# Patient Record
Sex: FEMALE | Race: OTHER | HISPANIC OR LATINO | URBAN - METROPOLITAN AREA
[De-identification: names, ages, dates, MRNs, and addresses within clinical notes are randomized per-mention and may not be internally consistent; named-entity substitution may affect disease eponyms.]

---

## 2020-06-11 ENCOUNTER — EMERGENCY (EMERGENCY)
Facility: HOSPITAL | Age: 40
LOS: 1 days | Discharge: HOME | End: 2020-06-11
Attending: EMERGENCY MEDICINE
Payer: MEDICAID

## 2020-06-11 VITALS
WEIGHT: 139.99 LBS | TEMPERATURE: 98 F | OXYGEN SATURATION: 100 % | SYSTOLIC BLOOD PRESSURE: 116 MMHG | DIASTOLIC BLOOD PRESSURE: 79 MMHG | RESPIRATION RATE: 18 BRPM | HEART RATE: 85 BPM

## 2020-06-11 LAB
ALBUMIN SERPL ELPH-MCNC: 4.6 G/DL — SIGNIFICANT CHANGE UP (ref 3.3–5)
ALP SERPL-CCNC: 85 U/L — SIGNIFICANT CHANGE UP (ref 40–120)
ALT FLD-CCNC: 26 U/L — SIGNIFICANT CHANGE UP (ref 10–45)
ANION GAP SERPL CALC-SCNC: 12 MMOL/L — SIGNIFICANT CHANGE UP (ref 5–17)
APPEARANCE UR: CLEAR — SIGNIFICANT CHANGE UP
APTT BLD: 30.1 SEC — SIGNIFICANT CHANGE UP (ref 27.5–36.3)
AST SERPL-CCNC: 22 U/L — SIGNIFICANT CHANGE UP (ref 10–40)
BASOPHILS # BLD AUTO: 0.07 K/UL — SIGNIFICANT CHANGE UP (ref 0–0.2)
BASOPHILS NFR BLD AUTO: 0.6 % — SIGNIFICANT CHANGE UP (ref 0–2)
BILIRUB SERPL-MCNC: 0.2 MG/DL — SIGNIFICANT CHANGE UP (ref 0.2–1.2)
BILIRUB UR-MCNC: NEGATIVE — SIGNIFICANT CHANGE UP
BLD GP AB SCN SERPL QL: NEGATIVE — SIGNIFICANT CHANGE UP
BUN SERPL-MCNC: 21 MG/DL — SIGNIFICANT CHANGE UP (ref 7–23)
CALCIUM SERPL-MCNC: 9.4 MG/DL — SIGNIFICANT CHANGE UP (ref 8.4–10.5)
CHLORIDE SERPL-SCNC: 102 MMOL/L — SIGNIFICANT CHANGE UP (ref 96–108)
CO2 SERPL-SCNC: 24 MMOL/L — SIGNIFICANT CHANGE UP (ref 22–31)
COLOR SPEC: SIGNIFICANT CHANGE UP
CREAT SERPL-MCNC: 0.86 MG/DL — SIGNIFICANT CHANGE UP (ref 0.5–1.3)
DIFF PNL FLD: ABNORMAL
EOSINOPHIL # BLD AUTO: 0.11 K/UL — SIGNIFICANT CHANGE UP (ref 0–0.5)
EOSINOPHIL NFR BLD AUTO: 1 % — SIGNIFICANT CHANGE UP (ref 0–6)
GLUCOSE SERPL-MCNC: 82 MG/DL — SIGNIFICANT CHANGE UP (ref 70–99)
GLUCOSE UR QL: NEGATIVE — SIGNIFICANT CHANGE UP
HCG UR QL: NEGATIVE — SIGNIFICANT CHANGE UP
HCT VFR BLD CALC: 40.2 % — SIGNIFICANT CHANGE UP (ref 34.5–45)
HGB BLD-MCNC: 13.2 G/DL — SIGNIFICANT CHANGE UP (ref 11.5–15.5)
IMM GRANULOCYTES NFR BLD AUTO: 0.3 % — SIGNIFICANT CHANGE UP (ref 0–1.5)
INR BLD: 1.03 RATIO — SIGNIFICANT CHANGE UP (ref 0.88–1.16)
KETONES UR-MCNC: NEGATIVE — SIGNIFICANT CHANGE UP
LEUKOCYTE ESTERASE UR-ACNC: ABNORMAL
LIDOCAIN IGE QN: 26 U/L — SIGNIFICANT CHANGE UP (ref 7–60)
LYMPHOCYTES # BLD AUTO: 3.96 K/UL — HIGH (ref 1–3.3)
LYMPHOCYTES # BLD AUTO: 36.3 % — SIGNIFICANT CHANGE UP (ref 13–44)
MCHC RBC-ENTMCNC: 29.9 PG — SIGNIFICANT CHANGE UP (ref 27–34)
MCHC RBC-ENTMCNC: 32.8 GM/DL — SIGNIFICANT CHANGE UP (ref 32–36)
MCV RBC AUTO: 91 FL — SIGNIFICANT CHANGE UP (ref 80–100)
MONOCYTES # BLD AUTO: 0.97 K/UL — HIGH (ref 0–0.9)
MONOCYTES NFR BLD AUTO: 8.9 % — SIGNIFICANT CHANGE UP (ref 2–14)
NEUTROPHILS # BLD AUTO: 5.77 K/UL — SIGNIFICANT CHANGE UP (ref 1.8–7.4)
NEUTROPHILS NFR BLD AUTO: 52.9 % — SIGNIFICANT CHANGE UP (ref 43–77)
NITRITE UR-MCNC: NEGATIVE — SIGNIFICANT CHANGE UP
NRBC # BLD: 0 /100 WBCS — SIGNIFICANT CHANGE UP (ref 0–0)
PH UR: 7.5 — SIGNIFICANT CHANGE UP (ref 5–8)
PLATELET # BLD AUTO: 362 K/UL — SIGNIFICANT CHANGE UP (ref 150–400)
POTASSIUM SERPL-MCNC: 3.6 MMOL/L — SIGNIFICANT CHANGE UP (ref 3.5–5.3)
POTASSIUM SERPL-SCNC: 3.6 MMOL/L — SIGNIFICANT CHANGE UP (ref 3.5–5.3)
PROT SERPL-MCNC: 7.8 G/DL — SIGNIFICANT CHANGE UP (ref 6–8.3)
PROT UR-MCNC: SIGNIFICANT CHANGE UP
PROTHROM AB SERPL-ACNC: 11.7 SEC — SIGNIFICANT CHANGE UP (ref 10–12.9)
RBC # BLD: 4.42 M/UL — SIGNIFICANT CHANGE UP (ref 3.8–5.2)
RBC # FLD: 11.7 % — SIGNIFICANT CHANGE UP (ref 10.3–14.5)
RH IG SCN BLD-IMP: NEGATIVE — SIGNIFICANT CHANGE UP
SODIUM SERPL-SCNC: 138 MMOL/L — SIGNIFICANT CHANGE UP (ref 135–145)
SP GR SPEC: 1.03 — HIGH (ref 1.01–1.02)
UROBILINOGEN FLD QL: ABNORMAL
WBC # BLD: 10.91 K/UL — HIGH (ref 3.8–10.5)
WBC # FLD AUTO: 10.91 K/UL — HIGH (ref 3.8–10.5)

## 2020-06-11 PROCEDURE — 99285 EMERGENCY DEPT VISIT HI MDM: CPT

## 2020-06-11 PROCEDURE — 99283 EMERGENCY DEPT VISIT LOW MDM: CPT

## 2020-06-11 RX ORDER — KETOROLAC TROMETHAMINE 30 MG/ML
15 SYRINGE (ML) INJECTION ONCE
Refills: 0 | Status: DISCONTINUED | OUTPATIENT
Start: 2020-06-11 | End: 2020-06-11

## 2020-06-11 RX ORDER — SODIUM CHLORIDE 9 MG/ML
1000 INJECTION INTRAMUSCULAR; INTRAVENOUS; SUBCUTANEOUS ONCE
Refills: 0 | Status: COMPLETED | OUTPATIENT
Start: 2020-06-11 | End: 2020-06-11

## 2020-06-11 RX ADMIN — SODIUM CHLORIDE 1000 MILLILITER(S): 9 INJECTION INTRAMUSCULAR; INTRAVENOUS; SUBCUTANEOUS at 23:27

## 2020-06-11 RX ADMIN — Medication 15 MILLIGRAM(S): at 23:58

## 2020-06-11 RX ADMIN — SODIUM CHLORIDE 1000 MILLILITER(S): 9 INJECTION INTRAMUSCULAR; INTRAVENOUS; SUBCUTANEOUS at 23:59

## 2020-06-11 NOTE — ED ADULT NURSE NOTE - OBJECTIVE STATEMENT
pt ambulatory to room 8 c/o 2 day h/o rlq intermittant pain, pt denies nausea vomiting fever no change witheating. ptneeds to hold r side when walking rlq very tender to touch pt was seen at  IV placed RL ac labs sent pt declines pain med.

## 2020-06-11 NOTE — ED PROVIDER NOTE - OBJECTIVE STATEMENT
40y F w/ PMHx s/p breast implant p/w intermittent RLQ abd pain for 2d. Pain is worsened when walking. Food does not make pain worse. Pain stays for a few hours that generally resolves on its own. Pt went to UC and said it may be APPY and to come to ED for further eval. Last nml BM was this morning. LMP May 20th. Denies n/v/d, fevers, chills, or urinary sx. 40y F w/ PMHx s/p breast implant p/w intermittent RLQ abd pain for 2d. Pain is worsen with walking. Not worse with eating. Pain onsets suddenly and stays for a few hours then resolves on its own. Denies n/v/d, fevers, chills, or urinary sx. Pt went to  today had UA negative for infection, told it may be APPY and referred to the ED. Last nml BM was this morning. LMP May 20th. Denies cp, palpitations, sob, cough, HA, blurry vision, focal weakness. 40y F w/ PMHx s/p breast implant p/w intermittent RLQ abd pain for 2d. Pain is worsen with walking. Not worse with eating. Pain onsets suddenly and stays for a few hours then resolves on its own. Denies n/v/d, fevers, chills, or urinary sx. Pt went to  today had UA negative for infection, told it may be APPY and referred to the ED. Last nml BM was this morning. LMP May 20th. Denies cp, palpitations, sob, cough, HA, blurry vision, focal weakness.    obgyn- Dr. Shira High MD

## 2020-06-11 NOTE — ED PROVIDER NOTE - PROGRESS NOTE DETAILS
Ford, PGY1 - consulted surgery for ? early appy not visualized on CT - rec US appendix, will order. Discussed plan for observation for serial abd exams in CDU, pt pending covid swab. agreed to see pt pettet- received sign out, patient pending surgical eval for abd pain, patient understands plan and patient re-evaluated and doing well.  No acute issues at  this time. Updated patient with results thus far, pain well controlled with 2nd round analgesia. surg at bedside evaluating patient pettet- pain returning and severe, surgery cleared patient unlikely appx, reevaluated patient, in 10/10 pain, consulted obgyn for possible torsion will come to eval patient pettet- obgyn and surgery cleared patient, Patient feels well, tolerating PO. Discussed lab and radiology findings with patient. Patient feels comfortable going home. Gave home care and follow up instructions. Discussed which symptoms to look out for and when to return to the ED for further evaluation. Patient given opportunity to ask questions about their medical condition and had all questions answered. ATTG: : patient endorsed to me by Dr. Romeo, awaiting gyn. evaluated and no further intervention. patient feels better. no ttp on my re exam. trial po meds and home and return if worsens. return precautions provided.

## 2020-06-11 NOTE — ED PROVIDER NOTE - PHYSICAL EXAMINATION
GEN: NAD, well appearing young female  CHEST/LUNGS: CTAB  CARDIAC: No murmur or rubs, RRR  ABDOMEN: TTP RLQ, no rebound rigidity or guarding, negative Rovsing's  MSK: No lower ext edema, no CVA tenderness  SKIN: No rashes bruises GEN: NAD, well appearing young female  CHEST/LUNGS: CTAB no wheezes or rhonchi   CARDIAC: No murmur or rubs, RRR  ABDOMEN: soft non-distended +TTP RLQ, no rebound rigidity or guarding, negative Rovsing's  MSK: No lower ext edema, no CVA tenderness  SKIN: No rashes or bruises  PSYCH: appropriate mood normal affect GEN: NAD, well appearing young female  CHEST/LUNGS: CTAB no wheezes or rhonchi   CARDIAC: No murmur or rubs, RRR  ABDOMEN: soft non-distended +TTP RLQ, no rebound rigidity or guarding, negative Rovsing's  : no CMT, no vaginal discharge or bleeding, no adnexal tenderness b/l   MSK: No lower ext edema, no CVA tenderness  SKIN: No rashes or bruises  PSYCH: appropriate mood normal affect

## 2020-06-11 NOTE — ED PROVIDER NOTE - NSFOLLOWUPCLINICS_GEN_ALL_ED_FT
A Family Medicine Doctor  Family Medicine  .  [place]   Phone:   Fax:   Follow Up Time: Urgent    An OB/GYN physician  Obstetrics & Gynecology  .  [place]   Phone:   Fax:   Follow Up Time: Urgent

## 2020-06-11 NOTE — ED PROVIDER NOTE - CHPI ED SYMPTOMS NEG
no nausea/no blood in stool/no diarrhea/no chills/no dysuria/no fever/no hematuria/no vomiting/no burning urination

## 2020-06-11 NOTE — ED PROVIDER NOTE - CLINICAL SUMMARY MEDICAL DECISION MAKING FREE TEXT BOX
Alicia Winters): 40y F , no hx of abd surgery p/w 2d intermittent abd pain worsen w/ walking. No fever n/v/d. DDx APPY also considered cholecystitis, ruptured ectopic, but lower suspicion given LMP 3 weeks ago. Plan labs, UA, HGG, CT, r/o APPY. Alicia Winters): 40y F , no hx of abd surgery p/w 2d intermittent abd pain worsen w/ walking. No fever n/v/d. DDx appy, torsion, also considered cholecystitis, ruptured ectopic, but lower suspicion given LMP 3 weeks ago. Plan labs, UA, HGG, pelvic US r/o torsion CT r/o APPY. Alicia Winters): 40y F , no hx of abd surgery p/w 2d intermittent abd pain worsen w/ walking. No fever n/v/d. DDx appy, torsion, also considered cholecystitis, ruptured ectopic, but lower suspicion given LMP 3 weeks ago. Plan labs, UA, HGG, pelvic US r/o torsion CT r/o APPY.    Attending Statement: Agree with the above.  RLQ pain, intermittent/waxing/waning, worse c ambulation.  Uncomfortable appearing with focal RLQ tender to palpation but no rosving/psoas and HDS.  No CVAT.  DDx as above.  Likely appy v ruptured ovarian cyst, less likely torsion though possible.  Needs plan as above, possible surgery/OB cs based on imaging/clincial course.  --BMM

## 2020-06-11 NOTE — ED PROVIDER NOTE - NSFOLLOWUPINSTRUCTIONS_ED_ALL_ED_FT
Abdominal Pain    Many things can cause abdominal pain. Many times, abdominal pain is not caused by a disease and will improve without treatment. Your health care provider will do a physical exam to determine if there is a dangerous cause of your pain; blood tests and imaging may help determine the cause of your pain. However, in many cases, no cause may be found and you may need further testing as an outpatient. Monitor your abdominal pain for any changes.     SEEK IMMEDIATE MEDICAL CARE IF YOU HAVE ANY OF THE FOLLOWING SYMPTOMS: worsening abdominal pain, uncontrollable vomiting, profuse diarrhea, inability to have bowel movements or pass gas, black or bloody stools, fever accompanying chest pain or back pain, or fainting. These symptoms may represent a serious problem that is an emergency. Do not wait to see if the symptoms will go away. Get medical help right away. Call 911 and do not drive yourself to the hospital.    1. TAKE ALL MEDICATIONS AS DIRECTED.    2. FOR PAIN OR FEVER YOU CAN TAKE IBUPROFEN (MOTRIN, ADVIL) OR ACETAMINOPHEN (TYLENOL) AS NEEDED, AS DIRECTED ON PACKAGING.  3. FOLLOW UP WITH YOUR PRIMARY DOCTOR WITHIN 5 DAYS AS DIRECTED.  4. IF YOU HAD LABS OR IMAGING DONE, YOU WERE GIVEN COPIES OF ALL LABS AND/OR IMAGING RESULTS FROM YOUR ER VISIT--PLEASE TAKE THEM WITH YOU TO YOUR FOLLOW UP APPOINTMENTS.  5. RETURN TO THE ER FOR ANY WORSENING SYMPTOMS OR CONCERNS.

## 2020-06-11 NOTE — ED PROVIDER NOTE - PATIENT PORTAL LINK FT
You can access the FollowMyHealth Patient Portal offered by [facility] by registering at the following website: [email or web address]. By joining [employer]’s FollowMyHealth portal, you will also be able to view your health information using other applications (apps) compatible with our system.

## 2020-06-12 VITALS
RESPIRATION RATE: 16 BRPM | HEART RATE: 91 BPM | DIASTOLIC BLOOD PRESSURE: 60 MMHG | OXYGEN SATURATION: 99 % | SYSTOLIC BLOOD PRESSURE: 100 MMHG

## 2020-06-12 LAB
BACTERIA # UR AUTO: ABNORMAL
EPI CELLS # UR: 3 /HPF — SIGNIFICANT CHANGE UP
HYALINE CASTS # UR AUTO: 5 /LPF — HIGH (ref 0–2)
RBC CASTS # UR COMP ASSIST: 4 /HPF — SIGNIFICANT CHANGE UP (ref 0–4)
SARS-COV-2 RNA SPEC QL NAA+PROBE: SIGNIFICANT CHANGE UP
WBC UR QL: 6 /HPF — HIGH (ref 0–5)

## 2020-06-12 PROCEDURE — 96376 TX/PRO/DX INJ SAME DRUG ADON: CPT

## 2020-06-12 PROCEDURE — 81001 URINALYSIS AUTO W/SCOPE: CPT

## 2020-06-12 PROCEDURE — 86850 RBC ANTIBODY SCREEN: CPT

## 2020-06-12 PROCEDURE — 93975 VASCULAR STUDY: CPT | Mod: 26

## 2020-06-12 PROCEDURE — 74177 CT ABD & PELVIS W/CONTRAST: CPT | Mod: 26

## 2020-06-12 PROCEDURE — 96361 HYDRATE IV INFUSION ADD-ON: CPT

## 2020-06-12 PROCEDURE — 86901 BLOOD TYPING SEROLOGIC RH(D): CPT

## 2020-06-12 PROCEDURE — 80053 COMPREHEN METABOLIC PANEL: CPT

## 2020-06-12 PROCEDURE — 81025 URINE PREGNANCY TEST: CPT

## 2020-06-12 PROCEDURE — 99284 EMERGENCY DEPT VISIT MOD MDM: CPT | Mod: 25

## 2020-06-12 PROCEDURE — 83690 ASSAY OF LIPASE: CPT

## 2020-06-12 PROCEDURE — 86900 BLOOD TYPING SEROLOGIC ABO: CPT

## 2020-06-12 PROCEDURE — 76856 US EXAM PELVIC COMPLETE: CPT

## 2020-06-12 PROCEDURE — 76830 TRANSVAGINAL US NON-OB: CPT

## 2020-06-12 PROCEDURE — 74177 CT ABD & PELVIS W/CONTRAST: CPT

## 2020-06-12 PROCEDURE — 76705 ECHO EXAM OF ABDOMEN: CPT | Mod: 26,59

## 2020-06-12 PROCEDURE — 76856 US EXAM PELVIC COMPLETE: CPT | Mod: 26,59

## 2020-06-12 PROCEDURE — 96375 TX/PRO/DX INJ NEW DRUG ADDON: CPT

## 2020-06-12 PROCEDURE — 76705 ECHO EXAM OF ABDOMEN: CPT

## 2020-06-12 PROCEDURE — 85730 THROMBOPLASTIN TIME PARTIAL: CPT

## 2020-06-12 PROCEDURE — 85610 PROTHROMBIN TIME: CPT

## 2020-06-12 PROCEDURE — 96365 THER/PROPH/DIAG IV INF INIT: CPT | Mod: XU

## 2020-06-12 PROCEDURE — 93975 VASCULAR STUDY: CPT

## 2020-06-12 PROCEDURE — 85027 COMPLETE CBC AUTOMATED: CPT

## 2020-06-12 PROCEDURE — 76830 TRANSVAGINAL US NON-OB: CPT | Mod: 26

## 2020-06-12 RX ORDER — ACETAMINOPHEN 500 MG
650 TABLET ORAL ONCE
Refills: 0 | Status: COMPLETED | OUTPATIENT
Start: 2020-06-12 | End: 2020-06-12

## 2020-06-12 RX ORDER — ACETAMINOPHEN 500 MG
975 TABLET ORAL ONCE
Refills: 0 | Status: COMPLETED | OUTPATIENT
Start: 2020-06-12 | End: 2020-06-12

## 2020-06-12 RX ORDER — KETOROLAC TROMETHAMINE 30 MG/ML
15 SYRINGE (ML) INJECTION ONCE
Refills: 0 | Status: DISCONTINUED | OUTPATIENT
Start: 2020-06-12 | End: 2020-06-12

## 2020-06-12 RX ORDER — CEFTRIAXONE 500 MG/1
1000 INJECTION, POWDER, FOR SOLUTION INTRAMUSCULAR; INTRAVENOUS ONCE
Refills: 0 | Status: COMPLETED | OUTPATIENT
Start: 2020-06-12 | End: 2020-06-12

## 2020-06-12 RX ADMIN — Medication 15 MILLIGRAM(S): at 09:35

## 2020-06-12 RX ADMIN — Medication 650 MILLIGRAM(S): at 03:14

## 2020-06-12 RX ADMIN — CEFTRIAXONE 1000 MILLIGRAM(S): 500 INJECTION, POWDER, FOR SOLUTION INTRAMUSCULAR; INTRAVENOUS at 03:15

## 2020-06-12 RX ADMIN — Medication 975 MILLIGRAM(S): at 12:15

## 2020-06-12 RX ADMIN — CEFTRIAXONE 100 MILLIGRAM(S): 500 INJECTION, POWDER, FOR SOLUTION INTRAMUSCULAR; INTRAVENOUS at 02:17

## 2020-06-12 NOTE — CONSULT NOTE ADULT - SUBJECTIVE AND OBJECTIVE BOX
SURGERY CONSULT NOTE  MARCI NATARAJAN  |  [id number]  |  20 @ 09:02    CC: Patient is a 40y old  Female who presents with a chief complaint of abdominal pain.     HPI:  40F no hx p/w 2d of sharp rlq abd pain. Pain started sharply 2d ago and was not brought on by anything. Patient states the pain originated in the RLQ and has not moved in location or changed in quality.  Intermittent changes in severity. Has been tolerating a diet.  Denies any changes in bowel or bladder function, denies any nausea / vomiting.     Denies any fevers / chills / ha / cp / sob / nausea / vomiting / changes in bowel or bladder function.   Denies hematuria / hematochezia / hematemesis / melena.     LMP end of may.     REVIEW OF SYSTEMS:  As above.     PAST MEDICAL & SURGICAL HISTORY:  s/p b/l breast implants.     ALLERGIES:  No Known Allergies or Intolerances    SOCIAL HISTORY:  Smoking Hx: denies  Etoh Hx: social   IVDA Hx: denies    FAMILY HISTORY:  - unless noted, no significant family hx with Mother, Father, Siblings    Objective:   Vital Signs Last 24 Hrs  T(C): 36.6 (2020 07:52), Max: 36.8 (2020 20:45)  T(F): 97.8 (2020 07:52), Max: 98.3 (2020 20:45)  HR: 81 (2020 07:52) (72 - 85)  BP: 117/80 (2020 07:52) (108/74 - 117/80)  RR: 17 (2020 07:52) (16 - 18)  SpO2: 98% (2020 07:52) (98% - 100%)    Physical Exam:  General: Well developed, well nourished, alert and cooperative, and appears to be in no acute distress.  HEENT: normocephalic, vision is grossly intact  Chest: respirations grossly unlabored, lying supine on ra  Abdomen: soft, non-distended, focally tender to deep palpation in the right lower quadrant approx 2cm above the inguinal ligament. no guarding or rebound. Negative psoas sign. Negative rovsing sign. Negative obturator sign.     LABS:                        13.2   10.91 )-----------( 362      ( 2020 22:47 )             40.2     06-11    138  |  102  |  21  ----------------------------<  82  3.6   |  24  |  0.86    Ca    9.4      2020 22:47    TPro  7.8  /  Alb  4.6  /  TBili  0.2  /  DBili  x   /  AST  22  /  ALT  26  /  AlkPhos  85  06-11    PT/INR - ( 2020 23:06 )   PT: 11.7 sec;   INR: 1.03 ratio    PTT - ( 2020 23:06 )  PTT:30.1 sec    LIVER FUNCTIONS - ( 2020 22:47 )  Alb: 4.6 g/dL / Pro: 7.8 g/dL / ALK PHOS: 85 U/L / ALT: 26 U/L / AST: 22 U/L / GGT: x           Urinalysis Basic - ( 2020 23:34 )    Color: Light Yellow / Appearance: Clear / S.026 / pH: x  Gluc: x / Ketone: Negative  / Bili: Negative / Urobili: 2 mg/dL   Blood: x / Protein: Trace / Nitrite: Negative   Leuk Esterase: Moderate / RBC: 4 /hpf / WBC 6 /HPF   Sq Epi: x / Non Sq Epi: 3 /hpf / Bacteria: Many    RADIOLOGY & ADDITIONAL STUDIES:    < from: US Transvaginal (20 @ 02:07) >  IMPRESSION:    No sonographic evidence of ovarian torsion.    2 cm right ovarian complex septated cyst, likely corpus luteum cyst. Follow-up pelvic ultrasound in 1-2 menstrual cycle is advised to demonstrate resolution.    < end of copied text >  < from: US Appendix (20 @ 06:45) >  FINDINGS/  IMPRESSION:     The appendix is not visualized, rendering the study nondiagnostic in evaluation for acute appendicitis. However, please note that a normal appendix is identified on the prior CT, in retrospect.    < end of copied text >      < from: CT Abdomen and Pelvis w/ IV Cont (20 @ 00:12) >  IMPRESSION:     Appendix is not visualized without secondary findings of acute appendicitis. Recommendclinical correlation.    Heterogeneous globular uterus. 2 x 1.6 cm right adnexal density, likely corpus luteum or complex ovarian cyst. Consider nonemergent correlation with pelvic ultrasound if indicated.    < end of copied text >

## 2020-06-12 NOTE — CONSULT NOTE ADULT - SUBJECTIVE AND OBJECTIVE BOX
Gyn Consult Note  MARCI NATARAJAN  40y  Female [id number]    HPI:      Name of GYN Physician: Dr. Parson    OBHx: NSVDx1 ()  GYNHx: Denies fibroids, cysts, endometriosis, STI's, abnormal pap smears   PMHx: Denies  PSHx: Breast augmentation  Meds: Denies  Allergies: NKDA    Vital Signs Last 24 Hrs  T(C): 36.6 (2020 07:52), Max: 36.8 (2020 20:45)  T(F): 97.8 (2020 07:52), Max: 98.3 (2020 20:45)  HR: 81 (2020 07:52) (72 - 85)  BP: 117/80 (2020 07:52) (108/74 - 117/80)  RR: 17 (2020 07:52) (16 - 18)  SpO2: 98% (2020 07:52) (98% - 100%)    Physical Exam:   General: Resting comfortably in bed, NAD   Back: No CVA tenderness  Abd: Soft, non-distended. RLQ tenderness to deep palpation. No rebound tenderness or guarding.  :  No bleeding.  External labia wnl.  Bimanual exam with no cervical motion tenderness, uterus wnl, adnexa non palpable b/l.  Cervix closed.   Ext: non-tender b/l, no edema     LABS:  Pregnancy Profile, Urine: Negative (20 @ 23:34)                          13.2   10.91 )-----------( 362      ( 2020 22:47 )             40.2     06-11    138  |  102  |  21  ----------------------------<  82  3.6   |  24  |  0.86    Ca    9.4      2020 22:47    TPro  7.8  /  Alb  4.6  /  TBili  0.2  /  DBili  x   /  AST  22  /  ALT  26  /  AlkPhos  85  06-11    I&O's Detail    PT/INR - ( 2020 23:06 )   PT: 11.7 sec;   INR: 1.03 ratio         PTT - ( 2020 23:06 )  PTT:30.1 sec  Urinalysis Basic - ( 2020 23:34 )    Color: Light Yellow / Appearance: Clear / S.026 / pH: x  Gluc: x / Ketone: Negative  / Bili: Negative / Urobili: 2 mg/dL   Blood: x / Protein: Trace / Nitrite: Negative   Leuk Esterase: Moderate / RBC: 4 /hpf / WBC 6 /HPF   Sq Epi: x / Non Sq Epi: 3 /hpf / Bacteria: Many        RADIOLOGY & ADDITIONAL STUDIES:  EXAM:  US PELVIC COMPLETE                        EXAM:  US DPLX PELVIC                        EXAM:  US TRANSVAGINAL                        PROCEDURE DATE:  2020      INTERPRETATION:  CLINICAL INFORMATION: Right lower quadrant pain.  LMP: 2020.  COMPARISON: Correlate with same day CT abdomen and pelvis.    TECHNIQUE:   Endovaginal and transabdominal pelvic sonogram. Color and Spectral Doppler was performed.    FINDINGS:  Uterus: Heterogenous in appearance, measuring 8.2 x 4.3 x 4.8 cm.  Endometrium: 11.1 mm. Within normal limits.    Right ovary: 2.2 x 1.9 x 2.9 cm with a 1.5 x 1.0 x 1.8 cm complex cystic lesion containing septations and echogenic foci. Normal arterial and venous waveforms.  Left ovary: 2.7 x1.9 x 1.8 cm. Within normal limits. Normal arterial and venous waveforms.    Fluid: Trace cul-de-sac free fluid.    IMPRESSION:  No sonographic evidence of ovarian torsion.  2 cm right ovarian complex septated cyst, likely corpus luteum cyst. Follow-up pelvic ultrasound in 1-2 menstrual cycle is advised to demonstrate resolution.    A/P:    Ana Cristina Rene PGY-1 Gyn Consult Note  MARCI NATARAJAN  40y  Female [id number]    HPI: 41y/o  LMP  presenting with 2d hx of RLQ pain. Patient unable to identify any triggering factor to pain - denies onset during intercourse, exercise or sudden movement. States pain is sharp and constant,9/10 however currently improved after pain medication (Toradol at 12AM, Acetaminophen at 3AM). Denies nausea, emesis or difficulty tolerating PO (last ate 5PM ). Notes pain is worse when sitting or with movement. Has taken acetaminophen and ibuprofen at home, and notes only minimal improvement. She denies hx of UTI's. Denies urinary urgency, frequency or dysuria.     Ms. Natarajan is sexually active, uses condoms for contraception. Denies recent sexual activity. Denies hx of STI. Denies abnormal vaginal discharge, itching or discomfort. Endorses regular periods.     Urine hcg negative in ED.     Name of GYN Physician: Dr. Parson    OBHx: NSVDx1 ()  GYNHx: Denies fibroids, cysts, endometriosis, STI's, abnormal pap smears   PMHx: Denies  PSHx: Breast augmentation  Meds: Denies  Allergies: NKDA    Vital Signs Last 24 Hrs  T(C): 36.6 (2020 07:52), Max: 36.8 (2020 20:45)  T(F): 97.8 (2020 07:52), Max: 98.3 (2020 20:45)  HR: 81 (2020 07:52) (72 - 85)  BP: 117/80 (2020 07:52) (108/74 - 117/80)  RR: 17 (2020 07:52) (16 - 18)  SpO2: 98% (2020 07:52) (98% - 100%)    Physical Exam:   General: Resting comfortably in bed, NAD   Back: No CVA tenderness  Abd: Soft, non-distended. RLQ tenderness to deep palpation. No rebound tenderness or guarding.  :  No bleeding.  External labia wnl.  Bimanual exam with no cervical motion tenderness, uterus wnl, adnexa non palpable b/l.  Cervix closed.   Ext: non-tender b/l, no edema     LABS:  Pregnancy Profile, Urine: Negative (06-11-20 @ 23:34)                          13.2   10.91 )-----------( 362      ( 2020 22:47 )             40.2     06-11    138  |  102  |  21  ----------------------------<  82  3.6   |  24  |  0.86    Ca    9.4      2020 22:47    TPro  7.8  /  Alb  4.6  /  TBili  0.2  /  DBili  x   /  AST  22  /  ALT  26  /  AlkPhos  85  06-11    I&O's Detail    PT/INR - ( 2020 23:06 )   PT: 11.7 sec;   INR: 1.03 ratio         PTT - ( 2020 23:06 )  PTT:30.1 sec  Urinalysis Basic - ( 2020 23:34 )    Color: Light Yellow / Appearance: Clear / S.026 / pH: x  Gluc: x / Ketone: Negative  / Bili: Negative / Urobili: 2 mg/dL   Blood: x / Protein: Trace / Nitrite: Negative   Leuk Esterase: Moderate / RBC: 4 /hpf / WBC 6 /HPF   Sq Epi: x / Non Sq Epi: 3 /hpf / Bacteria: Many        RADIOLOGY & ADDITIONAL STUDIES:  EXAM:  US PELVIC COMPLETE                        EXAM:  US DPLX PELVIC                        EXAM:  US TRANSVAGINAL                        PROCEDURE DATE:  2020      INTERPRETATION:  CLINICAL INFORMATION: Right lower quadrant pain.  LMP: 2020.  COMPARISON: Correlate with same day CT abdomen and pelvis.    TECHNIQUE:   Endovaginal and transabdominal pelvic sonogram. Color and Spectral Doppler was performed.    FINDINGS:  Uterus: Heterogenous in appearance, measuring 8.2 x 4.3 x 4.8 cm.  Endometrium: 11.1 mm. Within normal limits.    Right ovary: 2.2 x 1.9 x 2.9 cm with a 1.5 x 1.0 x 1.8 cm complex cystic lesion containing septations and echogenic foci. Normal arterial and venous waveforms.  Left ovary: 2.7 x1.9 x 1.8 cm. Within normal limits. Normal arterial and venous waveforms.    Fluid: Trace cul-de-sac free fluid.    IMPRESSION:  No sonographic evidence of ovarian torsion.  2 cm right ovarian complex septated cyst, likely corpus luteum cyst. Follow-up pelvic ultrasound in 1-2 menstrual cycle is advised to demonstrate resolution.

## 2020-06-12 NOTE — CONSULT NOTE ADULT - ATTENDING COMMENTS
Pt seen in ER w resident.  Agree w above note.  39yo w RLQ pain.  No evidence of torsion on exam or imaging,  No acute GYN intervention needed.  Pt will need repeat sono in 6-8wks to f/u cyst.  Pt has GYN in [place] and will f/u as outpt.

## 2020-06-12 NOTE — ED ADULT NURSE REASSESSMENT NOTE - NS ED NURSE REASSESS COMMENT FT1
Report received from Andrea. Pt AAOx4, NAD, resp nonlabored, skin warm/dry, resting in bed. Pt c/o lower abdominal pain. Pt denies headache, dizziness, chest pain, palpitations, SOB, abd pain, n/v/d, urinary symptoms, fevers, chills, weakness at this time. Pt awaiting surg recommendation. Safety maintained.

## 2020-06-12 NOTE — ED ADULT NURSE REASSESSMENT NOTE - NS ED NURSE REASSESS COMMENT FT1
Pt AAOx4, NAD, resting comfortably in bed. Pt denies headache, dizziness, chest pain, cough, SOB, abdominal pain, n/v/d, urinary symptoms, fevers, chills, weakness at this time. Pt discharged as per MD Mccullough, IV removed as per MD, pt ambulated independently out of ED. Pt instructed to return to ED if she has any further concerns or increased pain.

## 2020-06-12 NOTE — CONSULT NOTE ADULT - ASSESSMENT
39y/o  LMP 2020 presenting with 2d hx of RLQ pain, and sonographic evidence of corpus luteum cyst (2cm) on rt ovary. Patient with improvement to pain s/p pain medication upon initial presentation to ED (last given Acetaminophen at 3AM). Vitals stable. GYN consulted for r/o ovarian torsion - however, torsion unlikely given overall clinical picture: Improvement in pain, reassuring exam and TVUS findings.     - Patient without clinical or radiologic evidence of ovarian torsion.    - Pain control as per ED, patient currently with improvement and without recent requirement of pain medication    - Outpatient GYN followup as per routine     Patient seen and evaluated with Dr. Gerald Rene PGY-1 39y/o  LMP 2020 presenting with 2d hx of RLQ pain, and sonographic evidence of corpus luteum cyst (2cm) on rt ovary. Patient with improvement to pain s/p pain medication upon initial presentation to ED (last given Acetaminophen at 3AM). Vitals stable. GYN consulted for r/o ovarian torsion - however, torsion unlikely given overall clinical picture: Improvement in pain, reassuring exam and TVUS findings.     - Patient without clinical or radiologic evidence of ovarian torsion.    - Pain control as per ED, patient currently with improvement and without recent requirement of pain medication    - Recommend that patient follow-up with outpatient OBGYN in 6 weeks for cyst    Patient seen and evaluated with Dr. Gerald Rene PGY-1

## 2020-06-12 NOTE — CONSULT NOTE ADULT - ASSESSMENT
40F no hx p/w 2d of rlq abd pain concerning for acute appendicitis without radiographic or laboratory evidence of acute appendicitis.     - given clinical exam and radiographic findings, unlikely acute appendicitis  - would recommend gyn consult given findings of cystic lesion on r. adnexa  - no further general surgery requirements at present time.     Plan discussed with EMELIA Bullard MD.     Please contact [facility] (P: [phone]) with any questions.    BLANQUITA Pierce MD, PGY-II  Surgery, Red [facility]  Pager: [phone]  [facility]

## 2022-12-20 ENCOUNTER — APPOINTMENT (OUTPATIENT)
Dept: GASTROENTEROLOGY | Facility: CLINIC | Age: 42
End: 2022-12-20

## 2023-08-07 ENCOUNTER — EMERGENCY (EMERGENCY)
Facility: HOSPITAL | Age: 43
LOS: 0 days | Discharge: HOME | End: 2023-08-07
Attending: EMERGENCY MEDICINE
Payer: MEDICAID

## 2023-08-07 VITALS
HEART RATE: 64 BPM | OXYGEN SATURATION: 100 % | SYSTOLIC BLOOD PRESSURE: 92 MMHG | RESPIRATION RATE: 18 BRPM | TEMPERATURE: 98 F | DIASTOLIC BLOOD PRESSURE: 57 MMHG

## 2023-08-07 VITALS — WEIGHT: 149.03 LBS

## 2023-08-07 DIAGNOSIS — R07.89 OTHER CHEST PAIN: ICD-10-CM

## 2023-08-07 DIAGNOSIS — R09.1 PLEURISY: ICD-10-CM

## 2023-08-07 DIAGNOSIS — Z87.19 PERSONAL HISTORY OF OTHER DISEASES OF THE DIGESTIVE SYSTEM: ICD-10-CM

## 2023-08-07 LAB
ALBUMIN SERPL ELPH-MCNC: 3.7 G/DL — SIGNIFICANT CHANGE UP (ref 3.3–5)
ALP SERPL-CCNC: 77 U/L — SIGNIFICANT CHANGE UP (ref 40–120)
ALT FLD-CCNC: 38 U/L — SIGNIFICANT CHANGE UP (ref 12–78)
ANION GAP SERPL CALC-SCNC: 4 MMOL/L — LOW (ref 5–17)
AST SERPL-CCNC: 18 U/L — SIGNIFICANT CHANGE UP (ref 15–37)
BASOPHILS # BLD AUTO: 0.05 K/UL — SIGNIFICANT CHANGE UP (ref 0–0.2)
BASOPHILS NFR BLD AUTO: 0.6 % — SIGNIFICANT CHANGE UP (ref 0–2)
BILIRUB SERPL-MCNC: 0.6 MG/DL — SIGNIFICANT CHANGE UP (ref 0.2–1.2)
BUN SERPL-MCNC: 12 MG/DL — SIGNIFICANT CHANGE UP (ref 7–23)
CALCIUM SERPL-MCNC: 9.4 MG/DL — SIGNIFICANT CHANGE UP (ref 8.5–10.1)
CHLORIDE SERPL-SCNC: 108 MMOL/L — SIGNIFICANT CHANGE UP (ref 96–108)
CO2 SERPL-SCNC: 26 MMOL/L — SIGNIFICANT CHANGE UP (ref 22–31)
CREAT SERPL-MCNC: 0.73 MG/DL — SIGNIFICANT CHANGE UP (ref 0.5–1.3)
D DIMER BLD IA.RAPID-MCNC: <150 NG/ML DDU — SIGNIFICANT CHANGE UP
EGFR: 105 ML/MIN/1.73M2 — SIGNIFICANT CHANGE UP
EOSINOPHIL # BLD AUTO: 0.1 K/UL — SIGNIFICANT CHANGE UP (ref 0–0.5)
EOSINOPHIL NFR BLD AUTO: 1.2 % — SIGNIFICANT CHANGE UP (ref 0–6)
GLUCOSE SERPL-MCNC: 92 MG/DL — SIGNIFICANT CHANGE UP (ref 70–99)
HCT VFR BLD CALC: 39.3 % — SIGNIFICANT CHANGE UP (ref 34.5–45)
HGB BLD-MCNC: 13.3 G/DL — SIGNIFICANT CHANGE UP (ref 11.5–15.5)
IMM GRANULOCYTES NFR BLD AUTO: 0.2 % — SIGNIFICANT CHANGE UP (ref 0–0.9)
LYMPHOCYTES # BLD AUTO: 2.98 K/UL — SIGNIFICANT CHANGE UP (ref 1–3.3)
LYMPHOCYTES # BLD AUTO: 34.9 % — SIGNIFICANT CHANGE UP (ref 13–44)
MCHC RBC-ENTMCNC: 30.5 PG — SIGNIFICANT CHANGE UP (ref 27–34)
MCHC RBC-ENTMCNC: 33.8 GM/DL — SIGNIFICANT CHANGE UP (ref 32–36)
MCV RBC AUTO: 90.1 FL — SIGNIFICANT CHANGE UP (ref 80–100)
MONOCYTES # BLD AUTO: 0.79 K/UL — SIGNIFICANT CHANGE UP (ref 0–0.9)
MONOCYTES NFR BLD AUTO: 9.3 % — SIGNIFICANT CHANGE UP (ref 2–14)
NEUTROPHILS # BLD AUTO: 4.6 K/UL — SIGNIFICANT CHANGE UP (ref 1.8–7.4)
NEUTROPHILS NFR BLD AUTO: 53.8 % — SIGNIFICANT CHANGE UP (ref 43–77)
PLATELET # BLD AUTO: 365 K/UL — SIGNIFICANT CHANGE UP (ref 150–400)
POTASSIUM SERPL-MCNC: 4.9 MMOL/L — SIGNIFICANT CHANGE UP (ref 3.5–5.3)
POTASSIUM SERPL-SCNC: 4.9 MMOL/L — SIGNIFICANT CHANGE UP (ref 3.5–5.3)
PROT SERPL-MCNC: 7.9 GM/DL — SIGNIFICANT CHANGE UP (ref 6–8.3)
RBC # BLD: 4.36 M/UL — SIGNIFICANT CHANGE UP (ref 3.8–5.2)
RBC # FLD: 11.5 % — SIGNIFICANT CHANGE UP (ref 10.3–14.5)
SODIUM SERPL-SCNC: 138 MMOL/L — SIGNIFICANT CHANGE UP (ref 135–145)
TROPONIN I, HIGH SENSITIVITY RESULT: 3.01 NG/L — SIGNIFICANT CHANGE UP
WBC # BLD: 8.54 K/UL — SIGNIFICANT CHANGE UP (ref 3.8–10.5)
WBC # FLD AUTO: 8.54 K/UL — SIGNIFICANT CHANGE UP (ref 3.8–10.5)

## 2023-08-07 PROCEDURE — 96374 THER/PROPH/DIAG INJ IV PUSH: CPT

## 2023-08-07 PROCEDURE — 71045 X-RAY EXAM CHEST 1 VIEW: CPT

## 2023-08-07 PROCEDURE — 99285 EMERGENCY DEPT VISIT HI MDM: CPT | Mod: 25

## 2023-08-07 PROCEDURE — 85379 FIBRIN DEGRADATION QUANT: CPT

## 2023-08-07 PROCEDURE — 84484 ASSAY OF TROPONIN QUANT: CPT

## 2023-08-07 PROCEDURE — 99285 EMERGENCY DEPT VISIT HI MDM: CPT

## 2023-08-07 PROCEDURE — 71045 X-RAY EXAM CHEST 1 VIEW: CPT | Mod: 26

## 2023-08-07 PROCEDURE — 80053 COMPREHEN METABOLIC PANEL: CPT

## 2023-08-07 PROCEDURE — 93005 ELECTROCARDIOGRAM TRACING: CPT

## 2023-08-07 PROCEDURE — 93010 ELECTROCARDIOGRAM REPORT: CPT

## 2023-08-07 PROCEDURE — 85025 COMPLETE CBC W/AUTO DIFF WBC: CPT

## 2023-08-07 PROCEDURE — 36415 COLL VENOUS BLD VENIPUNCTURE: CPT

## 2023-08-07 RX ORDER — SUCRALFATE 1 G
1 TABLET ORAL ONCE
Refills: 0 | Status: COMPLETED | OUTPATIENT
Start: 2023-08-07 | End: 2023-08-07

## 2023-08-07 RX ORDER — DIAZEPAM 5 MG
1 TABLET ORAL
Qty: 6 | Refills: 0
Start: 2023-08-07

## 2023-08-07 RX ORDER — DIAZEPAM 5 MG
5 TABLET ORAL ONCE
Refills: 0 | Status: DISCONTINUED | OUTPATIENT
Start: 2023-08-07 | End: 2023-08-07

## 2023-08-07 RX ORDER — IBUPROFEN 200 MG
1 TABLET ORAL
Qty: 30 | Refills: 0
Start: 2023-08-07

## 2023-08-07 RX ORDER — LIDOCAINE 4 G/100G
1 CREAM TOPICAL ONCE
Refills: 0 | Status: COMPLETED | OUTPATIENT
Start: 2023-08-07 | End: 2023-08-07

## 2023-08-07 RX ORDER — KETOROLAC TROMETHAMINE 30 MG/ML
30 SYRINGE (ML) INJECTION ONCE
Refills: 0 | Status: DISCONTINUED | OUTPATIENT
Start: 2023-08-07 | End: 2023-08-07

## 2023-08-07 RX ORDER — LIDOCAINE 4 G/100G
1 CREAM TOPICAL
Qty: 5 | Refills: 0
Start: 2023-08-07

## 2023-08-07 RX ADMIN — Medication 30 MILLILITER(S): at 14:47

## 2023-08-07 RX ADMIN — Medication 30 MILLIGRAM(S): at 13:36

## 2023-08-07 RX ADMIN — Medication 5 MILLIGRAM(S): at 15:31

## 2023-08-07 RX ADMIN — LIDOCAINE 1 PATCH: 4 CREAM TOPICAL at 16:38

## 2023-08-07 RX ADMIN — Medication 1 GRAM(S): at 14:46

## 2023-08-07 NOTE — ED ADULT NURSE NOTE - NSFALLRISKINTERV_ED_ALL_ED

## 2023-08-07 NOTE — ED STATDOCS - OBJECTIVE STATEMENT
42 y/o female w/PMHx of gastritis presents to ED c/o midsternal chest pain worse with deep breaths and movements that started this morning.  No coughs. Denies birth control use. Pt is a non-smoker.

## 2023-08-07 NOTE — ED STATDOCS - PATIENT PORTAL LINK FT
You can access the FollowMyHealth Patient Portal offered by [facility] by registering at the following website: [email or web address]. By joining [employer]’s FollowMyHealth portal, you will also be able to view your health information using other applications (apps) compatible with our system.

## 2023-08-07 NOTE — ED ADULT NURSE NOTE - OBJECTIVE STATEMENT
Pt a&o x 3, states having consistent chest pain starting at 10 AM that worsened throughout the day. Continuous tele monitor in place.

## 2023-08-07 NOTE — ED ADULT NURSE NOTE - AS PAIN REST
Pregnancy And Lactation Warning Text: The risk during pregnancy and breastfeeding is uncertain with this medication. 10 (severe pain)

## 2023-08-07 NOTE — ED STATDOCS - ATTENDING APP SHARED VISIT CONTRIBUTION OF CARE
I personally saw the patient with the BETO, and completed the key components of the history and physical exam. I then discussed the management plan with the BETO.

## 2023-08-07 NOTE — ED STATDOCS - CLINICAL SUMMARY MEDICAL DECISION MAKING FREE TEXT BOX
42 y/o F with no significant PMHx, non smoker, comes in with chest pain, pleuritic in nature. no trauma, no recent travels. will check labs, troponin, d-dimer, Toradol, and reassess.

## 2023-08-07 NOTE — ED STATDOCS - NS ED ATTENDING STATEMENT MOD
Yes
This was a shared visit with the BETO. I reviewed and verified the documentation and independently performed the documented:

## 2023-08-07 NOTE — ED STATDOCS - PROGRESS NOTE DETAILS
44 y/o F with PMH of gastritis presents with CP worse when taking a deep breath since this AM. States pain radiates to left shoulder. Denies fever, chills, cough, OCP use, history of tobacco use. PE: uncomfortable appearing. Cardiac: s1s2, RRR. lungs: CTAb. Abdomen: NBS x4, soft, nontender. PV: NO LE edema, calf tenderness. A/P: r/o ACS, PE, plan for labs, CXR, EKG, reassess. - Kwan Franco PA-C Pt reports no change in pain with toradol. Labs and imaging reviewed with no actionable findings. WIll provide additional medications and reassess. - Kwan Franco PA-C

## 2023-08-07 NOTE — ED STATDOCS - NSFOLLOWUPINSTRUCTIONS_ED_ALL_ED_FT
Pleurisy    Pleurisy is irritation and inflammation of the linings of the lungs (pleura). Pleura cover the outside of the lungs and the inside of the chest wall.    Normally, there is a small amount of fluid (pleural fluid) between the pleura that allows the lungs to move in and out smoothly when you breathe. Pleurisy can cause the pleura to be rough and dry and rub together when breathing, making it difficult to breathe or cough. In some cases, pleurisy can be associated with a buildup of fluid between the pleura (pleural effusion). Pleurisy is also called pleuritis.    What are the causes?  Common causes of this condition include:  A lung infection caused by bacteria or a virus.  A blood clot that travels to the lungs (pulmonary embolism).  Air leaking into the pleural space (pneumothorax). This can happen due to injury or trauma to the chest.  Lung cancer or a lung tumor.  Heart or chest surgery.  Lung damage from inhaling asbestos.  A lung reaction to certain medicines, or treatments for cancer, such as chemotherapy or radiation therapy to the chest.  Diseases that can cause lung inflammation. These include rheumatoid arthritis, lupus, sickle cell disease, inflammatory bowel disease, and pancreatitis.  Sometimes, the cause of this condition is not known.    What are the signs or symptoms?  The main symptom of this condition is chest pain. The pain is usually on one side. Chest pain may start suddenly and be sharp or stabbing. It may become a constant dull ache. You may also feel pain in your back or shoulder. The pain may get worse when you cough, take deep breaths, or make sudden movements. Other symptoms may include:  Shortness of breath.  Noisy breathing (wheezing or rattling).  Cough.  Chills.  Fever.  Coughing up blood (hemoptysis) or yellowish mucus from your lungs (sputum).  Symptoms can be worse with certain positions, such as when lying down or lying to one side. Signs and symptoms of pleurisy may be very similar to the signs and symptoms of a heart attack or inflammation of the heart (pericarditis).    How is this diagnosed?  This condition may be diagnosed based on:  Your medical history, especially if you have heart or lung disease.  Your symptoms.  A physical exam. Your health care provider will listen to your breathing with a stethoscope to check for a rough, rubbing sound (friction rub) when you breathe. Your breath sounds may be muffled and decreased on the affected side.  Tests. You may have:  Blood tests to check for infections or diseases and to measure the oxygen in your blood.  An EKG to look at your heart rhythm.  Imaging tests of your lungs. These may include a chest X-ray, ultrasound, an MRI, or a CT scan.  A procedure using a needle to remove pleural fluid for testing (thoracentesis).  How is this treated?  Treatment for this condition depends on the cause. Pleurisy that was caused by a virus usually clears up within 2 weeks. Treatment for pleurisy may include:  NSAIDs, such as ibuprofen, to help relieve pain and inflammation.  Antibiotic medicines, if your condition was caused by a bacterial infection.  Prescription pain medicine or cough medicine.  Blood-thinning (anticoagulant) medicines to treat blood clots, if your condition was caused by pulmonary embolism.  Removal of pleural fluid (thoracentesis) or air, using a chest tube to vacuum fluid or air from the pleural space.  Follow these instructions at home:  Medicines    Take over-the-counter and prescription medicines only as told by your health care provider.  If you were prescribed an antibiotic, take it as told by your health care provider. Do not stop taking the antibiotic even if you start to feel better.  If you were prescribed medicines to remove extra fluid from your lungs (diuretics), take them as told by your health care provider.  If you were prescribed an anticoagulant, take it exactly as told by your health care provider. This is important.  Activity    Rest and return to your normal activities as told by your health care provider. Ask your health care provider what activities are safe for you.  Ask your health care provider if the medicine prescribed to you requires you to avoid driving or using machinery.  General instructions      Watch for any changes in your condition.  Take deep breaths often, even if it is painful. This can help prevent lung infection (pneumonia) and collapse of lung tissue (atelectasis).  You may be given a medical device (incentive spirometer) to help exercise your lungs and breathing, to prevent lung complications.  Do not use any products that contain nicotine or tobacco, such as cigarettes, e-cigarettes, and chewing tobacco. If you need help quitting, ask your health care provider.  Keep all follow-up visits as told by your health care provider. This is important.  Contact a health care provider if:  You have pain that:  Gets worse or more frequent.  Does not get better with medicines you were prescribed.  You have a fever or chills.  Your cough or shortness of breath is not improving or getting worse.  You cough up pus-like (purulent) fluid.  Get help right away if:  You cough up blood.  You have any of the following symptoms that get worse:  Difficulty breathing with activity, especially minimal activity.  Shortness of breath.  Wheezing.  You have pain that spreads into your neck, arms, or jaw.  You feel dizzy or faint.  These symptoms may represent a serious problem that is an emergency. Do not wait to see if the symptoms will go away. Get medical help right away. Call your local emergency services (911 in the [place] Do not drive yourself to the hospital.    Summary  Pleurisy is inflammation of the linings of the lungs. Pleurisy causes pain that makes it difficult for you to breathe or cough.  Do not use any products that contain nicotine or tobacco, such as cigarettes, e-cigarettes, and chewing tobacco. If you need help quitting, ask your health care provider.  Rest and return to your normal activities as told by your health care provider. Ask your health care provider what activities are safe for you.  Keep all follow-up visits as told by your health care provider. This is important.  This information is not intended to replace advice given to you by your health care provider. Make sure you discuss any questions you have with your health care provider.

## 2024-07-30 ENCOUNTER — EMERGENCY (EMERGENCY)
Facility: HOSPITAL | Age: 44
LOS: 1 days | Discharge: HOME | End: 2024-07-30
Payer: MEDICAID

## 2024-07-30 VITALS
SYSTOLIC BLOOD PRESSURE: 130 MMHG | HEART RATE: 96 BPM | TEMPERATURE: 98 F | RESPIRATION RATE: 16 BRPM | DIASTOLIC BLOOD PRESSURE: 80 MMHG | OXYGEN SATURATION: 99 % | WEIGHT: 134.92 LBS

## 2024-07-30 LAB
ALBUMIN SERPL ELPH-MCNC: 3.9 G/DL — SIGNIFICANT CHANGE UP (ref 3.3–5)
ALP SERPL-CCNC: 60 U/L — SIGNIFICANT CHANGE UP (ref 40–120)
ALT FLD-CCNC: 25 U/L — SIGNIFICANT CHANGE UP (ref 10–45)
ANION GAP SERPL CALC-SCNC: 13 MMOL/L — SIGNIFICANT CHANGE UP (ref 5–17)
AST SERPL-CCNC: 18 U/L — SIGNIFICANT CHANGE UP (ref 10–40)
BASOPHILS # BLD AUTO: 0.05 K/UL — SIGNIFICANT CHANGE UP (ref 0–0.2)
BASOPHILS NFR BLD AUTO: 0.5 % — SIGNIFICANT CHANGE UP (ref 0–2)
BILIRUB SERPL-MCNC: 0.2 MG/DL — SIGNIFICANT CHANGE UP (ref 0.2–1.2)
BUN SERPL-MCNC: 15 MG/DL — SIGNIFICANT CHANGE UP (ref 7–23)
CALCIUM SERPL-MCNC: 9 MG/DL — SIGNIFICANT CHANGE UP (ref 8.4–10.5)
CHLORIDE SERPL-SCNC: 103 MMOL/L — SIGNIFICANT CHANGE UP (ref 96–108)
CO2 SERPL-SCNC: 21 MMOL/L — LOW (ref 22–31)
CREAT SERPL-MCNC: 0.79 MG/DL — SIGNIFICANT CHANGE UP (ref 0.5–1.3)
EGFR: 95 ML/MIN/1.73M2 — SIGNIFICANT CHANGE UP
EOSINOPHIL # BLD AUTO: 0.17 K/UL — SIGNIFICANT CHANGE UP (ref 0–0.5)
EOSINOPHIL NFR BLD AUTO: 1.7 % — SIGNIFICANT CHANGE UP (ref 0–6)
GLUCOSE SERPL-MCNC: 89 MG/DL — SIGNIFICANT CHANGE UP (ref 70–99)
HCT VFR BLD CALC: 35.8 % — SIGNIFICANT CHANGE UP (ref 34.5–45)
HGB BLD-MCNC: 12 G/DL — SIGNIFICANT CHANGE UP (ref 11.5–15.5)
IMM GRANULOCYTES NFR BLD AUTO: 0.3 % — SIGNIFICANT CHANGE UP (ref 0–0.9)
LYMPHOCYTES # BLD AUTO: 4.5 K/UL — HIGH (ref 1–3.3)
LYMPHOCYTES # BLD AUTO: 44.3 % — HIGH (ref 13–44)
MCHC RBC-ENTMCNC: 30.2 PG — SIGNIFICANT CHANGE UP (ref 27–34)
MCHC RBC-ENTMCNC: 33.5 GM/DL — SIGNIFICANT CHANGE UP (ref 32–36)
MCV RBC AUTO: 90.2 FL — SIGNIFICANT CHANGE UP (ref 80–100)
MONOCYTES # BLD AUTO: 0.78 K/UL — SIGNIFICANT CHANGE UP (ref 0–0.9)
MONOCYTES NFR BLD AUTO: 7.7 % — SIGNIFICANT CHANGE UP (ref 2–14)
NEUTROPHILS # BLD AUTO: 4.63 K/UL — SIGNIFICANT CHANGE UP (ref 1.8–7.4)
NEUTROPHILS NFR BLD AUTO: 45.5 % — SIGNIFICANT CHANGE UP (ref 43–77)
NRBC # BLD: 0 /100 WBCS — SIGNIFICANT CHANGE UP (ref 0–0)
PLATELET # BLD AUTO: 361 K/UL — SIGNIFICANT CHANGE UP (ref 150–400)
POTASSIUM SERPL-MCNC: 4.1 MMOL/L — SIGNIFICANT CHANGE UP (ref 3.5–5.3)
POTASSIUM SERPL-SCNC: 4.1 MMOL/L — SIGNIFICANT CHANGE UP (ref 3.5–5.3)
PROT SERPL-MCNC: 7.1 G/DL — SIGNIFICANT CHANGE UP (ref 6–8.3)
RBC # BLD: 3.97 M/UL — SIGNIFICANT CHANGE UP (ref 3.8–5.2)
RBC # FLD: 11.9 % — SIGNIFICANT CHANGE UP (ref 10.3–14.5)
SODIUM SERPL-SCNC: 137 MMOL/L — SIGNIFICANT CHANGE UP (ref 135–145)
WBC # BLD: 10.16 K/UL — SIGNIFICANT CHANGE UP (ref 3.8–10.5)
WBC # FLD AUTO: 10.16 K/UL — SIGNIFICANT CHANGE UP (ref 3.8–10.5)

## 2024-07-30 PROCEDURE — 99285 EMERGENCY DEPT VISIT HI MDM: CPT

## 2024-07-30 RX ORDER — METOCLOPRAMIDE 5 MG/5ML
10 SOLUTION ORAL ONCE
Refills: 0 | Status: COMPLETED | OUTPATIENT
Start: 2024-07-30 | End: 2024-07-30

## 2024-07-30 RX ORDER — KETOROLAC TROMETHAMINE 30 MG/ML
15 INJECTION, SOLUTION INTRAMUSCULAR ONCE
Refills: 0 | Status: DISCONTINUED | OUTPATIENT
Start: 2024-07-30 | End: 2024-07-30

## 2024-07-30 RX ADMIN — METOCLOPRAMIDE 10 MILLIGRAM(S): 5 SOLUTION ORAL at 22:41

## 2024-07-30 RX ADMIN — KETOROLAC TROMETHAMINE 15 MILLIGRAM(S): 30 INJECTION, SOLUTION INTRAMUSCULAR at 22:38

## 2024-07-30 NOTE — ED PROVIDER NOTE - PATIENT PORTAL LINK FT
You can access the FollowMyHealth Patient Portal offered by [facility] by registering at the following website: [email or web address]. By joining [employer]’s FollowMyHealth portal, you will also be able to view your health information using other applications (apps) compatible with our system.

## 2024-07-30 NOTE — ED PROVIDER NOTE - OBJECTIVE STATEMENT
45 yo female with no significant past medical hx presenting to the ED c/o left sided headache onset 3 weeks ago. 45 yo female with no significant past medical hx presenting to the ED c/o left sided headache onset 3 weeks ago. Pt  3 weeks ago she developed episodes  pain to the entire left side of her head a/w some dizziness every 4 hours that would . A week later she began feeling associated bilateral eye heaviness, but no vision changes. States 45 yo female with hx/o gastritis presenting to the ED c/o  headache onset 3 weeks ago. Pt  3 weeks ago she developed episodes of pain to the entire left side of her head a/w some dizziness every 4 hours that would subside after a couple of minutes. A week later she began feeling associated bilateral eye heaviness, but no vision changes. Also c/o bilateral numbness in her finger tips and toes that worsens pain episodes. States these episodes are becoming more frequent now and her sxs are becoming more intense. She was previously taking tylenol and advil for her sx that provided mild relief, but is now no longer helping . Pt is also feeling nauseous due to her sx and had an episode of vomiting today after drinking some Pedialyte. Patient states last pain episode occurred when she arrived to the ED and has no pain currently. Denies fever, chills, chest pain, sob, abdominal pain, diarrhea, urinary sx, and any other pain or sx at this time. No sick contacts. States that she normally drinks 1 cup of coffee and tumeric tea daily, No tobacco use.     [facility] July 15th

## 2024-07-30 NOTE — ED PROVIDER NOTE - PROGRESS NOTE DETAILS
Patient was seen sleeping comfortably in her bed. States that the medication improved her symptoms and has no pain currently. Discussed lab and imaging results. Told patient to follow up with Neurology if she continues to have these symptoms. Agreeable with plan to discharge home,

## 2024-07-30 NOTE — ED PROVIDER NOTE - NSFOLLOWUPINSTRUCTIONS_ED_ALL_ED_FT
Please follow up with Neurology if you continue to experience your symptoms. Please follow up with your Primary Care Physician about your visit to the Emergency Department. If you develop worsening headache, changes in vision, weakness in your arm or legs, please return to the Emergency Department.

## 2024-07-30 NOTE — ED PROVIDER NOTE - CLINICAL SUMMARY MEDICAL DECISION MAKING FREE TEXT BOX
44 year old female with no significant medical hx c/o intermittent left sided headache a/w heavy sensation in bilateral eyes, no vision changes onset 3 weeks ago. VSS in ED. physical was unremarkable. PERRL, EOMI, no focal neurological deficits. Presentation sounds like it could be new onset migraine, but cannot r/o acute intracranial pathology such as aneurysm. Will do CT head. Will do lab work and give medications for headache and nausea.

## 2024-07-30 NOTE — ED PROVIDER NOTE - NSFOLLOWUPCLINICS_GEN_ALL_ED_FT
[facility]  Neurology  [address] [place] [address] [address] [place] [address]  Phone: [phone]  Fax:

## 2024-07-30 NOTE — ED ADULT NURSE NOTE - OBJECTIVE STATEMENT
44yF, presents to the ED c.o blurry vision x2 weeks and headache. Pt states the headache has been worsening over the last 3 days. Endorsing some pressure behind b/l eyes. Pt saw ophthalmologist today and was prescribed some eye drops for dry eyes. Gross neuro intact, no difficulty speaking in complete sentences, pulses x 4, moving all extremities, abdomen soft nontender nondistended, skin intact.

## 2024-07-30 NOTE — ED ADULT TRIAGE NOTE - CHIEF COMPLAINT QUOTE
blurry vision x2 weeks, headache, worse over the last 3 days, patient endorses pressure behind the eye  saw eye doctor today they prescribed eye drops for dryness

## 2024-07-30 NOTE — ED PROVIDER NOTE - ATTENDING CONTRIBUTION TO CARE
Emergency Medicine Attending MD [place]:  patient seen and evaluated with the resident.  I was present for key portions of the History & Physical, and I agree with the Impression & Plan.    Patient is a 44-year-old female complaining of 3 weeks intermittent vaginal bulbar headaches with associated dizziness.  Waves of headache, every 4 hours over the last 45 minutes.  Associated symptoms: Eye heaviness, but no blurry vision or double vision.  Also associated bilateral hand and fingertip numbness that seems to intensify with the headaches    Medical problems none  Occupation hairdresser  Associated symptoms: No fevers, no chills    VS: wnl  Gen: Well appearing adult female in NAD  Head: NC/AT  Neck: trachea midline  Resp:  No distress  CV: RRR, no RMG  Abd: nondistended  Ext: no deformities  Neuro:  A&Ox4, pupils equal round reactive to light, extraocular movement is intact, cranial nerves II through XII within normal limits, strength 5 out of 5 bilaterally throughout, reflexes 2+ bilaterally throughout, normal tandem gait, normal finger-to-nose, SITLT.  Skin:  Warm and dry as visualized  Psych: appropriate    Medical Decision Making / Differential Diagnosis:  Description of headache is concerning enough to warrant CTA head and neck to rule out acute intracranial pathology.  Plan: Basic labs, imaging as above, Reglan, Toradol reassess.

## 2024-07-31 VITALS
OXYGEN SATURATION: 98 % | RESPIRATION RATE: 15 BRPM | DIASTOLIC BLOOD PRESSURE: 78 MMHG | SYSTOLIC BLOOD PRESSURE: 112 MMHG | HEART RATE: 68 BPM | TEMPERATURE: 98 F

## 2024-07-31 PROBLEM — K29.70 GASTRITIS, UNSPECIFIED, WITHOUT BLEEDING: Chronic | Status: ACTIVE | Noted: 2023-08-08

## 2024-07-31 LAB — HCG SERPL-ACNC: <2 MIU/ML — SIGNIFICANT CHANGE UP

## 2024-07-31 PROCEDURE — 96375 TX/PRO/DX INJ NEW DRUG ADDON: CPT | Mod: XU

## 2024-07-31 PROCEDURE — 70498 CT ANGIOGRAPHY NECK: CPT | Mod: MC

## 2024-07-31 PROCEDURE — 96374 THER/PROPH/DIAG INJ IV PUSH: CPT | Mod: XU

## 2024-07-31 PROCEDURE — 70498 CT ANGIOGRAPHY NECK: CPT | Mod: 26,MC

## 2024-07-31 PROCEDURE — 84702 CHORIONIC GONADOTROPIN TEST: CPT

## 2024-07-31 PROCEDURE — 80053 COMPREHEN METABOLIC PANEL: CPT

## 2024-07-31 PROCEDURE — 70496 CT ANGIOGRAPHY HEAD: CPT | Mod: 26,MC

## 2024-07-31 PROCEDURE — 99284 EMERGENCY DEPT VISIT MOD MDM: CPT | Mod: 25

## 2024-07-31 PROCEDURE — 85025 COMPLETE CBC W/AUTO DIFF WBC: CPT

## 2024-07-31 PROCEDURE — 70496 CT ANGIOGRAPHY HEAD: CPT | Mod: MC

## 2024-08-26 ENCOUNTER — OFFICE (OUTPATIENT)
Dept: URBAN - METROPOLITAN AREA CLINIC 6 | Facility: CLINIC | Age: 44
Setting detail: OPHTHALMOLOGY
End: 2024-08-26
Payer: MEDICAID

## 2024-08-26 ENCOUNTER — RX ONLY (RX ONLY)
Age: 44
End: 2024-08-26

## 2024-08-26 DIAGNOSIS — H43.391: ICD-10-CM

## 2024-08-26 PROBLEM — R51.9 HEADACHE, UNSPECIFIED: Status: ACTIVE | Noted: 2024-08-26

## 2024-08-26 PROBLEM — H52.7 REFRACTIVE ERROR: Status: ACTIVE | Noted: 2024-08-26

## 2024-08-26 PROCEDURE — 92004 COMPRE OPH EXAM NEW PT 1/>: CPT

## 2024-08-26 ASSESSMENT — CONFRONTATIONAL VISUAL FIELD TEST (CVF)
OD_FINDINGS: FULL
OS_FINDINGS: FULL

## 2025-02-18 NOTE — ED ADULT NURSE NOTE - BREATH SOUNDS, MLM
Pt here with syncopal episode, stood up to go to bathroom, collapsed in hallway, no thinners. R ankle pain and swelling.    Clear

## 2025-04-02 NOTE — ED PROVIDER NOTE - NS ED ATTENDING STATEMENT MOD
History & Physical    Name: Sienna Encarnacion MRN: [id number]  SSN: [id number]    YOB: 1998  Age: 26 y.o.  Sex: female        Subjective:     Estimated Date of Delivery: 25  OB History          1    Para        Term                AB        Living             SAB        IAB        Ectopic        Molar        Multiple        Live Births                    Ms. Encarnacion is admitted with pregnancy at 37w4d for with decreased fetal movement. Reports she started feeling less movement than normal around 2100. Prenatal course notable for GDM(diet controlled). Please see prenatal records for details.    Past Medical History:   Diagnosis Date    Gestational diabetes      Past Surgical History:   Procedure Laterality Date    WISDOM TOOTH EXTRACTION Bilateral      Social History     Occupational History    Not on file   Tobacco Use    Smoking status: Former     Types: Cigarettes    Smokeless tobacco: Never   Vaping Use    Vaping status: Every Day    Substances: Nicotine   Substance and Sexual Activity    Alcohol use: Yes     Comment: socal    Drug use: Defer    Sexual activity: Yes     Partners: Male     No family history on file.    No Known Allergies  Prior to Admission medications    Medication Sig Start Date End Date Taking? Authorizing Provider   terbutaline (BRETHINE) 2.5 MG tablet Take 1 tablet by mouth every 8 hours as needed (contractions) 2/28/25 3/3/25  Prakash Jackson Jr., MD   Prenatal Vit-Fe Fumarate-FA (PRENATAL VITAMIN) 28-0.8 MG TABS tablet Take 1 tablet by mouth daily    Provider, MD Pattie        Review of Systems: Pertinent items are noted in HPI.    Objective:     Vitals:  /70  HR 91  Afebrile     Physical Exam:  Patient without distress  Heart: Regular rate and rhythm  Lung: no wheezes, no rales, no rhonchi and normal respiratory effort  Back costovertebral angle tenderness: absent  Abdomen: soft, nontender  Fundus: soft and non tender  Perineum: 
I have personally seen and examined this patient.  I have fully participated in the care of this patient. I have reviewed all pertinent clinical information, including history, physical exam, plan and the Resident’s note and agree except as noted.